# Patient Record
Sex: MALE | Race: WHITE | NOT HISPANIC OR LATINO | Employment: OTHER | URBAN - METROPOLITAN AREA
[De-identification: names, ages, dates, MRNs, and addresses within clinical notes are randomized per-mention and may not be internally consistent; named-entity substitution may affect disease eponyms.]

---

## 2017-12-07 ENCOUNTER — ALLSCRIPTS OFFICE VISIT (OUTPATIENT)
Dept: OTHER | Facility: OTHER | Age: 67
End: 2017-12-07

## 2017-12-08 NOTE — PROGRESS NOTES
Assessment  1  Seborrheic keratosis (702 19) (L82 1)   2  Screening for skin condition (V82 0) (Z13 89)    Plan     · Follow-up visit in 1 year Evaluation and Treatment  Follow-up  Status: Hold For -Scheduling  Requested for: 01PFF7807   · Use a sun block product with an SPF of 15 or more ; Status:Complete;   Done:[id number]    Discussion/Summary  Discussion Summary:   Seborrheic keratosis patient reassured these are normal growths acquire with age no treatment needed nothing else of concern noted on complete exam followup in one year  Chief Complaint  Chief Complaint Free Text Note Form: yearly full body skin cancer exam      History of Present Illness  HPI: 81-year-old male presents for concerns regarding possible skin cancer with previous history of actinic keratosis  No specific complaints noted      Review of Systems  Complete Male Dermatology [facility]- Est Patient:  Constitutional: Denies constitutional symptoms  Eyes: Denies eye symptoms  ENT:  denies ear symptoms, nasal symptoms, mouth or throat symptoms  Cardiovascular: Denies cardiovascular symptoms  Respiratory: Denies respiratory symptoms  Gastrointestinal: Denies gastrointestinal symptoms  Musculoskeletal: Denies musculoskeletal symptoms  Integumentary: Denies skin, hair and nail symptoms  Neurological: Denies neurologic symptoms  Psychiatric: Denies psychiatric symptoms  Endocrine: Denies endocrine symptoms  Hematologic/Lymphatic: Denies hematologic symptoms  Active Problems  1  Screening for skin condition (V82 0) (Z13 89)   2  Seborrheic keratosis (702 19) (L82 1)    Past Medical History  Past Medical History Reviewed- Derm:   The past medical history was reviewed  Surgical History  Surgical History Reviewed [facility]- Derm:   Surgical History reviewed      Family History  Father    1   Family history of Leukemia  Family History Reviewed- Derm:   Family History was reviewed      Social History     · Former smoker (V15 82) (W45 492)  Social History Reviewed [address] [place] [address] [address]- Derm: The social history was reviewed      Current Meds   1  Atorvastatin Calcium 10 MG Oral Tablet; Therapy: (Recorded:25Ppg4237) to Recorded   2  Gabapentin TABS; Therapy: (Recorded:31Suo0140) to Recorded   3  Multi-Vitamins TABS; Therapy: (Recorded:20Mxf1423) to Recorded   4  Vitamin C Plus 1000 MG Oral Tablet; Therapy: (Recorded:33Qbk6965) to Recorded   5  Vitamin E CAPS; Therapy: (Recorded:62Thv5098) to Recorded  Medication List Reviewed: The medication list was reviewed and updated today  Allergies    1  Aspirin TABS    Physical Exam   Constitutional  General appearance: Appears healthy and well developed  Lymphatic  No visible disturbance  Musculoskeletal  Digits and nails: No clubbing, cyanosis or edema  Cutaneous and nail exam normal    Skin  Scalp skin texture and hair distribution: Normal skin texture on scalp, normal hair distribution  Head: Normal turgor, no rashes, no lesions  Neck: Normal turgor, no rashes, no lesions  Chest: Normal turgor, no rashes, no lesions  Abdomen: Normal turgor, no rashes, no lesions  Back: Normal turgor, no rashes, no lesions  Right upper extremity: Normal turgor, no rashes, no lesions  Left upper extremity: Normal turgor, no rashes, no lesions  Right lower extremity: Normal turgor, no rashes, no lesions  Left lower extremity: Normal turgor, no rashes, no lesions  Neuro/Psych  Alert and oriented x 3  Displays comfort and cooperation during encounterl  Affect is normal    Finding Normal keratotic papules with greasy stuck on appearance nothing atypical noted        Signatures   Electronically signed by : MITRA Perez ; Dec  7 2017  9:41AM EST                       (Author)

## 2019-01-18 ENCOUNTER — OFFICE VISIT (OUTPATIENT)
Dept: DERMATOLOGY | Facility: CLINIC | Age: 69
End: 2019-01-18
Payer: COMMERCIAL

## 2019-01-18 DIAGNOSIS — Z13.89 SCREENING FOR SKIN CONDITION: ICD-10-CM

## 2019-01-18 DIAGNOSIS — L82.1 SEBORRHEIC KERATOSIS: Primary | ICD-10-CM

## 2019-01-18 PROCEDURE — 99213 OFFICE O/P EST LOW 20 MIN: CPT | Performed by: DERMATOLOGY

## 2019-01-18 RX ORDER — ICOSAPENT ETHYL 1000 MG/1
2 CAPSULE ORAL 2 TIMES DAILY
Refills: 3 | COMMUNITY
Start: 2018-12-28

## 2019-01-18 RX ORDER — DIPHENOXYLATE HYDROCHLORIDE AND ATROPINE SULFATE 2.5; .025 MG/1; MG/1
TABLET ORAL
COMMUNITY

## 2019-01-18 NOTE — PROGRESS NOTES
[facility] DERMATOLOGY  [address] [place] [address]  [phone]  [phone]     MRN: [id number] : 1950  Encounter: [id number]  Patient Information: Geovani Oviedo  Chief complaint:  Yearly skin check    History of present illness:  72-year-old male presents for overall checkup concerned regarding numerous growths on his skin patient with recent bladder tumor removed no specific concerns patient with previous history of actinic keratosis  No past medical history on file  No past surgical history on file  Social History   History   Alcohol use Not on file     History   Drug use: Unknown     History   Smoking Status    Former Smoker   Smokeless Tobacco    Never Used     No family history on file  Meds/Allergies   Allergies   Allergen Reactions    Aspirin        Meds:  Prior to Admission medications    Medication Sig Start Date End Date Taking?  Authorizing Provider   multivitamin (THERAGRAN) TABS Take by mouth    Historical Provider, MD   VASCEPA 1 g CAPS 2 capsules 2 (two) times a day 18   Historical Provider, MD       Subjective:     Review of Systems:    General: negative for - chills, fatigue, fever,  weight gain or weight loss  Psychological: negative for - anxiety, behavioral disorder, concentration difficulties, decreased libido, depression, irritability, memory difficulties, mood swings, sleep disturbances or suicidal ideation  ENT: negative for - hearing difficulties , nasal congestion, nasal discharge, oral lesions, sinus pain, sneezing, sore throat  Allergy and Immunology: negative for - hives, insect bite sensitivity,  Hematological and Lymphatic: negative for - bleeding problems, blood clots,bruising, swollen lymph nodes  Endocrine: negative for - hair pattern changes, hot flashes, malaise/lethargy, mood swings, palpitations, polydipsia/polyuria, skin changes, temperature intolerance or unexpected weight change  Respiratory: negative for - cough, hemoptysis, orthopnea, shortness of breath, or wheezing  Cardiovascular: negative for - chest pain, dyspnea on exertion, edema,  Gastrointestinal: negative for - abdominal pain, nausea/vomiting  Genito-Urinary: negative for - dysuria, incontinence, irregular/heavy menses or urinary frequency/urgency  Musculoskeletal: negative for - gait disturbance, joint pain, joint stiffness, joint swelling, muscle pain, muscular weakness  Dermatological:  As in HPI  Neurological: negative for confusion, dizziness, headaches, impaired coordination/balance, memory loss, numbness/tingling, seizures, speech problems, tremors or weakness       Objective: There were no vitals taken for this visit  Physical Exam:    General Appearance:    Alert, cooperative, no distress   Head:    Normocephalic, without obvious abnormality, atraumatic           Skin:   A full skin exam was performed including scalp, head scalp, eyes, ears, nose, lips, neck, chest, axilla, abdomen, back, buttocks, bilateral upper extremities, bilateral lower extremities, hands, feet, fingers, toes, fingernails, and toenails normal keratotic papules greasy stuck on appearance previous site of actinic keratosis no activity noted nothing else of concern seen on exam     Assessment:     1  Seborrheic keratosis     2  Screening for skin condition           Plan:   Seborrheic Keratosis  Patient reasurred these are normal growths we acquire with age no treatment needed  Screening for Dermatologic Disorders: Nothing else of concern noted on complete exam follow up in 1 year       Conor Guzman MD  1/18/2019,8:56 AM    Portions of the record may have been created with voice recognition software   Occasional wrong word or "sound a like" substitutions may have occurred due to the inherent limitations of voice recognition software   Read the chart carefully and recognize, using context, where substitutions have occurred

## 2019-12-23 ENCOUNTER — OFFICE VISIT (OUTPATIENT)
Dept: DERMATOLOGY | Facility: CLINIC | Age: 69
End: 2019-12-23
Payer: COMMERCIAL

## 2019-12-23 DIAGNOSIS — L82.1 SEBORRHEIC KERATOSIS: Primary | ICD-10-CM

## 2019-12-23 DIAGNOSIS — Z13.89 SCREENING FOR SKIN CONDITION: ICD-10-CM

## 2019-12-23 PROCEDURE — 99213 OFFICE O/P EST LOW 20 MIN: CPT | Performed by: DERMATOLOGY

## 2019-12-23 NOTE — PROGRESS NOTES
[facility] DERMATOLOGY  [address] [place] [address]  [place] [address] [address]  [phone]  [phone]     MRN: [id number] : 1950  Encounter: [id number]  Patient Information: Paola Santiago  Chief complaint:  Yearly skin check    History of present illness:  67-year-old male without any previous history skin cancer presents for overall checkup no specific concerns noted not aware lesion we noted on his left shoulder no other concerns noted  No past medical history on file  No past surgical history on file  Social History   Social History     Substance and Sexual Activity   Alcohol Use Not on file     Social History     Substance and Sexual Activity   Drug Use Not on file     Social History     Tobacco Use   Smoking Status Former Smoker   Smokeless Tobacco Never Used     No family history on file  Meds/Allergies   Allergies   Allergen Reactions    Aspirin        Meds:  Prior to Admission medications    Medication Sig Start Date End Date Taking?  Authorizing Provider   multivitamin (THERAGRAN) TABS Take by mouth   Yes Historical Provider, MD   VASCEPA 1 g CAPS 2 capsules 2 (two) times a day 18  Yes Historical Provider, MD       Subjective:     Review of Systems:    General: negative for - chills, fatigue, fever,  weight gain or weight loss  Psychological: negative for - anxiety, behavioral disorder, concentration difficulties, decreased libido, depression, irritability, memory difficulties, mood swings, sleep disturbances or suicidal ideation  ENT: negative for - hearing difficulties , nasal congestion, nasal discharge, oral lesions, sinus pain, sneezing, sore throat  Allergy and Immunology: negative for - hives, insect bite sensitivity,  Hematological and Lymphatic: negative for - bleeding problems, blood clots,bruising, swollen lymph nodes  Endocrine: negative for - hair pattern changes, hot flashes, malaise/lethargy, mood swings, palpitations, polydipsia/polyuria, skin changes, temperature intolerance or unexpected weight change  Respiratory: negative for - cough, hemoptysis, orthopnea, shortness of breath, or wheezing  Cardiovascular: negative for - chest pain, dyspnea on exertion, edema,  Gastrointestinal: negative for - abdominal pain, nausea/vomiting  Genito-Urinary: negative for - dysuria, incontinence, irregular/heavy menses or urinary frequency/urgency  Musculoskeletal: negative for - gait disturbance, joint pain, joint stiffness, joint swelling, muscle pain, muscular weakness  Dermatological:  As in HPI  Neurological: negative for confusion, dizziness, headaches, impaired coordination/balance, memory loss, numbness/tingling, seizures, speech problems, tremors or weakness       Objective: There were no vitals taken for this visit  Physical Exam:    General Appearance:    Alert, cooperative, no distress   Head:    Normocephalic, without obvious abnormality, atraumatic           Skin:   A full skin exam was performed including scalp, head scalp, eyes, ears, nose, lips, neck, chest, axilla, abdomen, back, buttocks, bilateral upper extremities, bilateral lower extremities, hands, feet, fingers, toes, fingernails, and toenails 2 mm pink macule noted on the left anterior shoulder normal keratotic papules with greasy stuck on appearance nothing else remarkable noted on exam     Assessment:     1  Seborrheic keratosis     2  Screening for skin condition           Plan:   Seborrheic Keratosis  Patient reasurred these are normal growths we acquire with age no treatment needed    Screening for Dermatologic Disorders: Nothing else of concern noted on complete exam follow up in 1 year   Lesion on left shoulder could have been just excoriation patient is to keep an eye on this if it does not resolve continues to grow to patient to come back in earlier otherwise will plan follow-up again in 1 year    Norberto Posadas MD  12/23/2019,4:12 PM    Portions of the record may have been created with voice recognition software   Occasional wrong word or "sound a like" substitutions may have occurred due to the inherent limitations of voice recognition software   Read the chart carefully and recognize, using context, where substitutions have occurred

## 2019-12-23 NOTE — PATIENT INSTRUCTIONS
Seborrheic Keratosis  Patient reasurred these are normal growths we acquire with age no treatment needed    Screening for Dermatologic Disorders: Nothing else of concern noted on complete exam follow up in 1 year   Lesion on left shoulder could have been just excoriation patient is to keep an eye on this if it does not resolve continues to grow to patient to come back in earlier otherwise will plan follow-up again in 1 year

## 2020-12-28 ENCOUNTER — OFFICE VISIT (OUTPATIENT)
Dept: DERMATOLOGY | Facility: CLINIC | Age: 70
End: 2020-12-28
Payer: MEDICARE

## 2020-12-28 VITALS — TEMPERATURE: 97.1 F

## 2020-12-28 DIAGNOSIS — Z13.89 SCREENING FOR SKIN CONDITION: ICD-10-CM

## 2020-12-28 DIAGNOSIS — D22.9 NEVUS: Primary | ICD-10-CM

## 2020-12-28 DIAGNOSIS — L82.1 SEBORRHEIC KERATOSIS: ICD-10-CM

## 2020-12-28 PROCEDURE — 99213 OFFICE O/P EST LOW 20 MIN: CPT | Performed by: DERMATOLOGY

## 2021-03-03 DIAGNOSIS — Z23 NEED FOR VACCINATION: ICD-10-CM

## 2022-01-06 ENCOUNTER — OFFICE VISIT (OUTPATIENT)
Dept: DERMATOLOGY | Facility: CLINIC | Age: 72
End: 2022-01-06
Payer: MEDICARE

## 2022-01-06 VITALS — TEMPERATURE: 97.8 F | WEIGHT: 165 LBS | BODY MASS INDEX: 25.01 KG/M2 | HEIGHT: 68 IN

## 2022-01-06 DIAGNOSIS — D22.9 NEVUS: Primary | ICD-10-CM

## 2022-01-06 DIAGNOSIS — L82.1 SEBORRHEIC KERATOSIS: ICD-10-CM

## 2022-01-06 DIAGNOSIS — Z13.89 SCREENING FOR SKIN CONDITION: ICD-10-CM

## 2022-01-06 PROCEDURE — 99213 OFFICE O/P EST LOW 20 MIN: CPT | Performed by: DERMATOLOGY

## 2022-01-06 RX ORDER — ROSUVASTATIN CALCIUM 5 MG/1
5 TABLET, COATED ORAL DAILY
COMMUNITY
Start: 2021-11-09

## 2022-01-06 NOTE — PROGRESS NOTES
[address]  [address] [facility] [place] [address] [address]  [phone]  [phone]     MRN: [id number] : 1950  Encounter: [id number]  Patient Information: Ryan Gonzales  Chief complaint: yearly check up    History of present illness:  52-year-old male presents for overall skin check concerned regarding potential skin cancer no specific concerns or changes noted  Past Medical History:   Diagnosis Date    Seborrheic keratosis      History reviewed  No pertinent surgical history  Social History   Social History     Substance and Sexual Activity   Alcohol Use None     Social History     Substance and Sexual Activity   Drug Use Not on file     Social History     Tobacco Use   Smoking Status Former Smoker   Smokeless Tobacco Never Used     History reviewed  No pertinent family history  Meds/Allergies   Allergies   Allergen Reactions    Aspirin        Meds:  Prior to Admission medications    Medication Sig Start Date End Date Taking?  Authorizing Provider   rosuvastatin (CRESTOR) 5 mg tablet Take 5 mg by mouth daily 21  Yes Historical Provider, MD   multivitamin [facility]) TABS Take by mouth  Patient not taking: Reported on 2022     Historical Provider, MD   VASCEPA 1 g CAPS 2 capsules 2 (two) times a day  Patient not taking: Reported on 18   Historical Provider, MD       Subjective:     Review of Systems:    General: negative for - chills, fatigue, fever,  weight gain or weight loss  Psychological: negative for - anxiety, behavioral disorder, concentration difficulties, decreased libido, depression, irritability, memory difficulties, mood swings, sleep disturbances or suicidal ideation  ENT: negative for - hearing difficulties , nasal congestion, nasal discharge, oral lesions, sinus pain, sneezing, sore throat  Allergy and Immunology: negative for - hives, insect bite sensitivity,  Hematological and Lymphatic: negative for - bleeding problems, blood clots,bruising, swollen lymph nodes  Endocrine: negative for - hair pattern changes, hot flashes, malaise/lethargy, mood swings, palpitations, polydipsia/polyuria, skin changes, temperature intolerance or unexpected weight change  Respiratory: negative for - cough, hemoptysis, orthopnea, shortness of breath, or wheezing  Cardiovascular: negative for - chest pain, dyspnea on exertion, edema,  Gastrointestinal: negative for - abdominal pain, nausea/vomiting  Genito-Urinary: negative for - dysuria, incontinence, irregular/heavy menses or urinary frequency/urgency  Musculoskeletal: negative for - gait disturbance, joint pain, joint stiffness, joint swelling, muscle pain, muscular weakness  Dermatological:  As in HPI  Neurological: negative for confusion, dizziness, headaches, impaired coordination/balance, memory loss, numbness/tingling, seizures, speech problems, tremors or weakness       Objective:   Temp 97 8 °F (36 6 °C)   Ht 5' 8" (1 727 m)   Wt 74 8 kg (165 lb)   BMI 25 09 kg/m²     Physical Exam:    General Appearance:    Alert, cooperative, no distress   Head:    Normocephalic, without obvious abnormality, atraumatic           Skin:   A full skin exam was performed including scalp, head scalp, eyes, ears, nose, lips, neck, chest, axilla, abdomen, back, buttocks, bilateral upper extremities, bilateral lower extremities, hands, feet, fingers, toes, fingernails, and toenails normal pigmented lesion regular shape color normal keratotic papules greasy stuck appearance nothing else remarkable noted on complete exam     Assessment:     1  Nevus     2  Seborrheic keratosis     3  Screening for skin condition           Plan:   Nevi reviewed the concept of ABCDE and ugly duckling nothing markedly atypical patient reassured  Seborrheic Keratosis  Patient reasurred these are normal growths we acquire with age no treatment needed    Screening for Dermatologic Disorders: Nothing else of concern noted on complete exam follow up in 1 year       Kelsi Rodríguez MD  1/6/2022,8:39 AM    Portions of the record may have been created with voice recognition software   Occasional wrong word or "sound a like" substitutions may have occurred due to the inherent limitations of voice recognition software   Read the chart carefully and recognize, using context, where substitutions have occurred

## 2022-08-24 ENCOUNTER — APPOINTMENT (OUTPATIENT)
Dept: RADIOLOGY | Facility: MEDICAL CENTER | Age: 72
End: 2022-08-24
Attending: EMERGENCY MEDICINE
Payer: MEDICARE

## 2022-08-24 ENCOUNTER — HOSPITAL ENCOUNTER (EMERGENCY)
Facility: MEDICAL CENTER | Age: 72
End: 2022-08-24
Attending: EMERGENCY MEDICINE
Payer: MEDICARE

## 2022-08-24 VITALS
RESPIRATION RATE: 18 BRPM | HEART RATE: 79 BPM | HEIGHT: 73 IN | SYSTOLIC BLOOD PRESSURE: 132 MMHG | WEIGHT: 190 LBS | DIASTOLIC BLOOD PRESSURE: 77 MMHG | BODY MASS INDEX: 25.18 KG/M2 | TEMPERATURE: 98 F | OXYGEN SATURATION: 96 %

## 2022-08-24 DIAGNOSIS — S09.90XA CLOSED HEAD INJURY, INITIAL ENCOUNTER: ICD-10-CM

## 2022-08-24 DIAGNOSIS — M25.512 ACUTE PAIN OF LEFT SHOULDER: ICD-10-CM

## 2022-08-24 DIAGNOSIS — S01.01XA LACERATION OF SCALP, INITIAL ENCOUNTER: ICD-10-CM

## 2022-08-24 LAB — EKG IMPRESSION: NORMAL

## 2022-08-24 PROCEDURE — 700111 HCHG RX REV CODE 636 W/ 250 OVERRIDE (IP): Performed by: EMERGENCY MEDICINE

## 2022-08-24 PROCEDURE — 304999 HCHG REPAIR-SIMPLE/INTERMED LEVEL 1

## 2022-08-24 PROCEDURE — 93005 ELECTROCARDIOGRAM TRACING: CPT | Performed by: EMERGENCY MEDICINE

## 2022-08-24 PROCEDURE — 70450 CT HEAD/BRAIN W/O DYE: CPT

## 2022-08-24 PROCEDURE — 700101 HCHG RX REV CODE 250: Performed by: EMERGENCY MEDICINE

## 2022-08-24 PROCEDURE — 90471 IMMUNIZATION ADMIN: CPT

## 2022-08-24 PROCEDURE — 99284 EMERGENCY DEPT VISIT MOD MDM: CPT

## 2022-08-24 PROCEDURE — 73030 X-RAY EXAM OF SHOULDER: CPT | Mod: LT

## 2022-08-24 PROCEDURE — 90715 TDAP VACCINE 7 YRS/> IM: CPT | Performed by: EMERGENCY MEDICINE

## 2022-08-24 PROCEDURE — 305308 HCHG STAPLER,SKIN,DISP.

## 2022-08-24 RX ORDER — LIDOCAINE HYDROCHLORIDE AND EPINEPHRINE 15; 5 MG/ML; UG/ML
20 INJECTION, SOLUTION EPIDURAL ONCE
Status: COMPLETED | OUTPATIENT
Start: 2022-08-24 | End: 2022-08-24

## 2022-08-24 RX ADMIN — LIDOCAINE HYDROCHLORIDE,EPINEPHRINE BITARTRATE 20 ML: 15; .005 INJECTION, SOLUTION EPIDURAL; INFILTRATION; INTRACAUDAL; PERINEURAL at 22:28

## 2022-08-24 RX ADMIN — CLOSTRIDIUM TETANI TOXOID ANTIGEN (FORMALDEHYDE INACTIVATED), CORYNEBACTERIUM DIPHTHERIAE TOXOID ANTIGEN (FORMALDEHYDE INACTIVATED), BORDETELLA PERTUSSIS TOXOID ANTIGEN (GLUTARALDEHYDE INACTIVATED), BORDETELLA PERTUSSIS FILAMENTOUS HEMAGGLUTININ ANTIGEN (FORMALDEHYDE INACTIVATED), BORDETELLA PERTUSSIS PERTACTIN ANTIGEN, AND BORDETELLA PERTUSSIS FIMBRIAE 2/3 ANTIGEN 0.5 ML: 5; 2; 2.5; 5; 3; 5 INJECTION, SUSPENSION INTRAMUSCULAR at 22:52

## 2022-08-25 NOTE — ED NOTES
Patient discharged home with son-in-law. IV removed. AVS reviewed and understood, all questions answered.

## 2022-08-25 NOTE — ED TRIAGE NOTES
Chief Complaint   Patient presents with    T-5000 Head Injury     TBI     Pt was at his daughter's house in [place]. They were outside and pt lost his balance falling backwards in to a rock wall. Pt's daughter states that he lost consciousness for about 2 minutes and displayed some seizure like activity. There is about a 2 in laceration to the posterior head. Bleeding is controlled with a pressure dressing. C-collar in place.     Pt arrives via [facility]. He is alert and oriented x 4 though repetitive at times.

## 2022-08-25 NOTE — DISCHARGE INSTRUCTIONS
Wash wound daily with soap and water, apply antibiotic ointment. Return if you have increasing pain, redness,  fever, new or different headache, confusion, weakness, vision changes,vomiting or pus. Staples out in 5 - 7 days.

## 2022-08-25 NOTE — ED PROVIDER NOTES
"ED Provider Note    Scribed for Bernardino Martinez M.D. by Mark Herron. 8/24/2022, 9:43 PM.    Primary care provider: Pcp Not noted  Means of arrival: EMS  History obtained from: Patient  History limited by: None noted    CHIEF COMPLAINT  Chief Complaint   Patient presents with    T-5000 Head Injury     TBI     HPI  Amadeo Landis is a 71 y.o. male who presents to the Emergency Department via EMS in spinal precautions as a code TBI for evaluation of head pain onset just prior to arrival secondary to a fall. The patient states he also has left shoulder pain and loss of consciousness, but denies any chest pain or abdominal pain. He describes getting into an argument with his daughter when he fell backwards and hit his head on a rock wall. Per the daughter, the patient had seizure-like activity for 1-2 minutes prior to EMS arrival. Per EMS, the patient is exhibiting repetitive questions of the event. He had a blood glucose of 123 en route. He denies taking any blood thinning medications.    REVIEW OF SYSTEMS  Pertinent positives include posterior head pain, loss of consciousness, and left shoulder pain.   Pertinent negatives include chest pain or abdominal pain.   All other systems negative.    PAST MEDICAL HISTORY   has a past medical history of Diabetes (HCC) and Hypertension.    SURGICAL HISTORY  patient denies any surgical history    SOCIAL HISTORY  Social History     Tobacco Use    Smoking status: Never    Smokeless tobacco: Never   Vaping Use    Vaping Use: Never used   Substance Use Topics    Alcohol use: Never    Drug use: Never      Social History     Substance and Sexual Activity   Drug Use Not noted.       FAMILY HISTORY  No family history noted.    CURRENT MEDICATIONS  No current outpatient medications     ALLERGIES  None noted.    PHYSICAL EXAM  VITAL SIGNS: BP (!) 146/73   Pulse 82   Temp 37.6 °C (99.6 °F) (Temporal)   Resp 16   Ht 1.854 m (6' 1\")   Wt 86.2 kg (190 lb)   SpO2 91%   BMI 25.07 kg/m² "     Constitutional: Well developed, Well nourished, Mild distress.   HENT: Normocephalic, mask in place. Laceration to the left parietal region 3 cm in length.  Eyes: Conjunctiva normal, No discharge.   Neck: Supple, No stridor.   Cardiovascular: Normal heart rate, Normal rhythm, No murmurs, equal pulses.   Pulmonary: Normal breath sounds, No respiratory distress, No wheezing, No rales, No rhonchi.  Chest: No chest wall tenderness or deformity.   Abdomen:Soft, No tenderness, No masses, no rebound, no guarding.   Back: No CVA tenderness. No vertebral point tenderness.  Musculoskeletal: No major deformities noted. Slight tenderness to the left shoulder.  Skin: Warm, Dry, No erythema, No rash.   Neurologic: Alert & oriented x 3, Normal motor function,  No focal deficits noted.   Psychiatric: Affect normal, Judgment normal, Mood normal.     EKG  Results for orders placed or performed during the hospital encounter of 22   EKG   Result Value Ref Range    Report       [facility] Emergency Dept.    Test Date:  2022  Pt Name:    ROBERT DE LA CRUZ                  Department: ER  MRN:        [id number]                      Room:       BL 15  Gender:     Male                         Technician: [id number]  :        1950                   Requested By:MARJORIE JUNG  Order #:    [id number]                    Reading MD: MARJORIE JUNG MD    Measurements  Intervals                                Axis  Rate:       81                           P:          -39  CT:         157                          QRS:        -24  QRSD:       93                           T:          0  QT:         397  QTc:        461    Interpretive Statements  Sinus rhythm, rate of 81, leftward axis,  Borderline T abnormalities, inferior leads  Baseline wander in lead(s) I,II,aVR  No previous ECG available for comparison  Electronically Signed On 2022 23:23:01 PDT by MARJORIE JUNG MD     12 Lead EKG  interpreted by me as above.    RADIOLOGY  DX-SHOULDER 2+ LEFT   Final Result         1.  No acute traumatic bony injury.         CT-HEAD W/O   Final Result         1.  No acute intracranial abnormality.   2.  Atherosclerosis.           The radiologist's interpretation of all radiological studies have been reviewed by me.    Laceration Repair Procedure    Indication: Laceration    Location/Description: Left parietal laceration    Procedure: The patient was placed in the appropriate position and anesthesia around the laceration was obtained by infiltration using 5.0 cc of 1% Lidocaine with epinephrine. The area was then cleansed with betadine and draped in a sterile fashion. The laceration was closed with staples. There were no additional lacerations requiring repair. The wound area was then dressed with a sterile dressing.      Total repaired wound length: 3 cm.     Other Items: Staple count: 8    The patient tolerated the procedure well.    Complications: None     COURSE & MEDICAL DECISION MAKING  Pertinent Labs & Imaging studies reviewed. (See chart for details)    9:43 PM - Patient seen and examined at bedside. The patient will be medicated with Adacel 0.5 mL injection for his symptoms. Ordered CT-Head w/o, DX-Chest, and EKG to evaluate his symptoms. The differential diagnoses include but are not limited to: Intercranial Hemorrhace, Laceration, Fracture or contusion of shoulder.     10:19 PM  - Patient was reevaluated at bedside. Discussed radiology results with the patient and informed them that his CT shows no acute abnormalities at this time.  Laceration repair procedure performed by myself at this time, as detailed above. I discussed plan for discharge and follow up as outlined below. The patient is stable for discharge at this time and will return for any new or worsening symptoms. Patient verbalizes understanding and support with my plan for discharge.       11:19 PM - Patient was reevaluated at bedside.  Updated the patient's family regarding the plan of care at this time. Educated the family regarding concussions and return precautions.    Medical Decision Making: At this point time patient's mental status is cleared.  CT of the head does not show any intracranial hemorrhage.  His laceration has been closed.  X-ray of his shoulder does not show any acute fracture I think this is mostly contusion.  Patient had no other signs of injury and did not complain of neck or back pain.    The patient is referred to a primary physician for blood pressure management, diabetic screening, and for all other preventative health concerns.    DISPOSITION:  Patient will be discharged home in stable condition.    FOLLOW UP:  Your doctor    Schedule an appointment as soon as possible for a visit in 5 days  For staple removal    [place] [facility] - Primary Care  [address] [facility] [address] [phone]    If you need a doctor    [facility] - Primary Care and Family Medicine  [address] [facility] [id number] [phone]    If you need a doctor    FINAL IMPRESSION  1. Laceration of scalp, initial encounter    2. Closed head injury, initial encounter    3. Acute pain of left shoulder     Laceration Repair Procedure     Mark ZAMORA (Connie), am scribing for, and in the presence of, Bernardino Martinez M.D.    Electronically signed by: Mark Herron (Connie), 8/24/2022    Bernardino ZAMORA M.D. personally performed the services described in this documentation, as scribed by Mark Herron in my presence, and it is both accurate and complete. C.    The note accurately reflects work and decisions made by me.  Bernardino Martinez M.D.  8/25/2022  2:57 AM

## 2022-11-04 ENCOUNTER — PATIENT MESSAGE (OUTPATIENT)
Dept: HEALTH INFORMATION MANAGEMENT | Facility: OTHER | Age: 72
End: 2022-11-04

## 2023-01-10 ENCOUNTER — OFFICE VISIT (OUTPATIENT)
Dept: DERMATOLOGY | Facility: CLINIC | Age: 73
End: 2023-01-10

## 2023-01-10 VITALS — BODY MASS INDEX: 25.31 KG/M2 | HEIGHT: 68 IN | WEIGHT: 167 LBS

## 2023-01-10 DIAGNOSIS — L82.1 SEBORRHEIC KERATOSIS: ICD-10-CM

## 2023-01-10 DIAGNOSIS — D22.9 NEVUS: ICD-10-CM

## 2023-01-10 DIAGNOSIS — L57.0 ACTINIC KERATOSIS: Primary | ICD-10-CM

## 2023-01-10 DIAGNOSIS — Z13.89 SCREENING FOR SKIN CONDITION: ICD-10-CM

## 2023-01-10 NOTE — PROGRESS NOTES
[facility] Dermatology Clinic Note     Patient Name: Erika Bundy  Encounter Date: January 10, 2023     Have you been cared for by a [facility] Dermatologist in the last 3 years and, if so, which description applies to you? Yes  I have been here within the last 3 years, and my medical history has NOT changed since that time  I am MALE/not capable of bearing children  REVIEW OF SYSTEMS:  Have you recently had or currently have any of the following? · No changes in my recent health  PAST MEDICAL HISTORY:  Have you personally ever had or currently have any of the following? If "YES," then please provide more detail  · No changes in my medical history  FAMILY HISTORY:  Any "first degree relatives" (parent, brother, sister, or child) with the following? • No changes in my family's known health  PATIENT EXPERIENCE:    • Do you want the Dermatologist to perform a COMPLETE skin exam today including a clinical examination under the "bra and underwear" areas? Yes  • If necessary, do we have your permission to call and leave a detailed message on your Preferred Phone number that includes your specific medical information?   Yes      Allergies   Allergen Reactions   • Aspirin       Current Outpatient Medications:   •  multivitamin (THERAGRAN) TABS, Take by mouth, Disp: , Rfl:   •  rosuvastatin (CRESTOR) 5 mg tablet, Take 5 mg by mouth daily, Disp: , Rfl:   •  VASCEPA 1 g CAPS, 2 capsules 2 (two) times a day (Patient not taking: Reported on 1/6/2022), Disp: , Rfl: 3          • Whom besides the patient is providing clinical information about today's encounter?   o NO ADDITIONAL HISTORIAN (patient alone provided history)    Physical Exam and Assessment/Plan by Diagnosis:

## 2023-01-10 NOTE — PATIENT INSTRUCTIONS
Actinic Keratosis:  Patient advised lesions are precancers  These should resolve with cryosurgery if not to let us know sun block recommended  Nevi reviewed the concept of ABCDE and ugly duckling nothing markedly atypical patient reassured  Seborrheic Keratosis  Patient reasurred these are normal growths we acquire with age no treatment needed  Screening for Dermatologic Disorders: Nothing else of concern noted on complete exam follow up in 1 year   Treatment with Cryotherapy    The doctor has treated your skin with nitrogen, which is 320 degrees Fahrenheit below zero  He has given the treated area "frostbite "    Stinging should subside within a few hours  You can take Tylenol for pain, if needed  Over the next few days, it is normal if the area becomes reddened, a blood blister, or swollen with fluid  If the lesion treated was near the eye - you could get a swollen eye over the next few days  Do not panic! This is all temporary, and will resolve with time  There is no special treatment - just keep the area clean  Makeup and BandAids can be used, if preferred  When the area starts to dry up and peel off, using Vaseline can help healing  It usually takes up to a month for it to heal   Some lesions are recurrent and may require repeat treatments  If a lesion has not healed in one month, please don't hesitate to contact us  If you have any further questions that are not answered here, please call us  [phone]    Thank you for allowing us to care for you

## 2023-01-10 NOTE — PROGRESS NOTES
[facility] DERMATOLOGY  [address] [place] [id number]  [phone]  [phone]     MRN: [id number] : 1950  Encounter: [id number]  Patient Information: Mirela Posada  Chief complaint: Yearly checkup    History of present illness: 67-year-old male presents for overall skin check previous history of actinic keratosis and some scaly areas on his face again no other concerns noted  Past Medical History:   Diagnosis Date   • Seborrheic keratosis      No past surgical history on file  Social History   Social History     Substance and Sexual Activity   Alcohol Use None     Social History     Substance and Sexual Activity   Drug Use Not on file     Social History     Tobacco Use   Smoking Status Former   Smokeless Tobacco Never     No family history on file  Meds/Allergies   Allergies   Allergen Reactions   • Aspirin        Meds:  Prior to Admission medications    Medication Sig Start Date End Date Taking?  Authorizing Provider   multivitamin (THERAGRAN) TABS Take by mouth   Yes Historical Provider, MD   rosuvastatin (CRESTOR) 5 mg tablet Take 5 mg by mouth daily 21  Yes Historical Provider, MD   VASCEPA 1 g CAPS 2 capsules 2 (two) times a day  Patient not taking: Reported on 18   Historical Provider, MD       Subjective:     Review of Systems:    General: negative for - chills, fatigue, fever,  weight gain or weight loss  Psychological: negative for - anxiety, behavioral disorder, concentration difficulties, decreased libido, depression, irritability, memory difficulties, mood swings, sleep disturbances or suicidal ideation  ENT: negative for - hearing difficulties , nasal congestion, nasal discharge, oral lesions, sinus pain, sneezing, sore throat  Allergy and Immunology: negative for - hives, insect bite sensitivity,  Hematological and Lymphatic: negative for - bleeding problems, blood clots,bruising, swollen lymph nodes  Endocrine: negative for - hair pattern changes, hot flashes, malaise/lethargy, mood swings, palpitations, polydipsia/polyuria, skin changes, temperature intolerance or unexpected weight change  Respiratory: negative for - cough, hemoptysis, orthopnea, shortness of breath, or wheezing  Cardiovascular: negative for - chest pain, dyspnea on exertion, edema,  Gastrointestinal: negative for - abdominal pain, nausea/vomiting  Genito-Urinary: negative for - dysuria, incontinence, irregular/heavy menses or urinary frequency/urgency  Musculoskeletal: negative for - gait disturbance, joint pain, joint stiffness, joint swelling, muscle pain, muscular weakness  Dermatological:  As in HPI  Neurological: negative for confusion, dizziness, headaches, impaired coordination/balance, memory loss, numbness/tingling, seizures, speech problems, tremors or weakness       Objective:   Ht 5' 8" (1 727 m)   Wt 75 8 kg (167 lb)   BMI 25 39 kg/m²     Physical Exam:    General Appearance:    Alert, cooperative, no distress   Head:    Normocephalic, without obvious abnormality, atraumatic           Skin:   A full skin exam was performed including scalp, head scalp, eyes, ears, nose, lips, neck, chest, axilla, abdomen, back, buttocks, bilateral upper extremities, bilateral lower extremities, hands, feet, fingers, toes, fingernails, and toenails erythematous areas noted below keratotic papules with greasy stuck on appearance normal pigmented lesion regular shape and color nothing markedly atypical noted on exam  Physical Exam  Constitutional:        HENT:      Head:       Cryotherapy Procedure Note    Pre-operative Diagnosis: actinic keratosis    Plan:  1  Instructed to keep the area dry and clean thereafter  Apply petrolatum if area gets crusty  2  Recommended that the patient use acetaminophen  as needed for pain       Locations: Patient and dorsum of the left forearm    Indications: Destruction of actinic keratosis x8    Patient informed of risks (permanent scarring, infection, light or dark discoloration, bleeding, infection, weakness, numbness and recurrence of the lesion) and benefits of the procedure and verbal informed consent obtained  The areas are treated with liquid nitrogen therapy, frozen until ice ball extended 2 mm beyond lesion, allowed to thaw, and treated again  The patient tolerated procedure well  The patient was instructed on post-op care, warned that there may be blister formation, redness and pain  Recommend OTC analgesia as needed for pain  Condition:  Stable    Complications:  none  Assessment:     1  Actinic keratosis        2  Nevus        3  Seborrheic keratosis        4  Screening for skin condition              Plan:   Actinic Keratosis:  Patient advised lesions are precancers  These should resolve with cryosurgery if not to let us know sun block recommended  Nevi reviewed the concept of ABCDE and ugly duckling nothing markedly atypical patient reassured  Seborrheic Keratosis  Patient reasurred these are normal growths we acquire with age no treatment needed  Screening for Dermatologic Disorders: Nothing else of concern noted on complete exam follow up in 1 year     Emre Celaya MD  1/10/2023,9:08 AM    Portions of the record may have been created with voice recognition software   Occasional wrong word or "sound a like" substitutions may have occurred due to the inherent limitations of voice recognition software   Read the chart carefully and recognize, using context, where substitutions have occurred

## 2023-07-18 ENCOUNTER — OFFICE VISIT (OUTPATIENT)
Age: 73
End: 2023-07-18
Payer: MEDICARE

## 2023-07-18 VITALS — HEIGHT: 68 IN | WEIGHT: 167 LBS | BODY MASS INDEX: 25.31 KG/M2

## 2023-07-18 DIAGNOSIS — Z13.89 SCREENING FOR SKIN CONDITION: Primary | ICD-10-CM

## 2023-07-18 DIAGNOSIS — L57.0 ACTINIC KERATOSIS: ICD-10-CM

## 2023-07-18 DIAGNOSIS — D22.9 MULTIPLE NEVI: ICD-10-CM

## 2023-07-18 DIAGNOSIS — D18.01 CHERRY ANGIOMA: ICD-10-CM

## 2023-07-18 DIAGNOSIS — L82.1 SEBORRHEIC KERATOSIS: ICD-10-CM

## 2023-07-18 PROCEDURE — 17000 DESTRUCT PREMALG LESION: CPT | Performed by: DERMATOLOGY

## 2023-07-18 PROCEDURE — 99214 OFFICE O/P EST MOD 30 MIN: CPT | Performed by: DERMATOLOGY

## 2023-07-18 NOTE — PATIENT INSTRUCTIONS
Treatment with Cryotherapy    The doctor has treated your skin with nitrogen, which is 320 degrees Fahrenheit below zero. He has given the treated area "frostbite."    Stinging should subside within a few hours. You can take Tylenol for pain, if needed. Over the next few days, it is normal if the area becomes reddened, a blood blister, or swollen with fluid. If the lesion treated was near the eye - you could get a swollen eye over the next few days. Do not panic! This is all temporary, and will resolve with time. There is no special treatment - just keep the area clean. Makeup and BandAids can be used, if preferred. When the area starts to dry up and peel off, using Vaseline can help healing. It usually takes up to a month for it to heal.  Some lesions are recurrent and may require repeat treatments. If a lesion has not healed in one month, please don't hesitate to contact us. If you have any further questions that are not answered here, please call us. [phone]    Thank you for allowing us to care for you. MELANOCYTIC NEVI ("Moles")    Melanocytic nevi ("moles") are tan or brown, raised or flat areas of the skin which have an increased number of melanocytes. Melanocytes are the cells in our body which make pigment and account for skin color. Some moles are present at birth (I.e., "congenital nevi"), while others come up later in life (i.e., "acquired nevi"). The sun can stimulate the body to make more moles. Sunburns are not the only thing that triggers more moles. Chronic sun exposure can do it too. Clinically distinguishing a healthy mole from melanoma may be difficult, even for experienced dermatologists. The "ABCDE's" of moles have been suggested as a means of helping to alert a person to a suspicious mole and the possible increased risk of melanoma.   The suggestions for raising alert are as follows:    Asymmetry: Healthy moles tend to be symmetric, while melanomas are often asymmetric. Asymmetry means if you draw a line through the mole, the two halves do not match in color, size, shape, or surface texture. Asymmetry can be a result of rapid enlargement of a mole, the development of a raised area on a previously flat lesion, scaling, ulceration, bleeding or scabbing within the mole. Any mole that starts to demonstrate "asymmetry" should be examined promptly by a board certified dermatologist.     Border: Healthy moles tend to have discrete, even borders. The border of a melanoma often blends into the normal skin and does not sharply delineate the mole from normal skin. Any mole that starts to demonstrate "uneven borders" should be examined promptly by a board certified dermatologist.     Color: Healthy moles tend to be one color throughout. Melanomas tend to be made up of different colors ranging from dark black, blue, white, or red. Any mole that demonstrates a color change should be examined promptly by a board certified dermatologist.     Diameter: Healthy moles tend to be smaller than 0.6 cm in size; an exception are "congenital nevi" that can be larger. Melanomas tend to grow and can often be greater than 0.6 cm (1/4 of an inch, or the size of a pencil eraser). This is only a guideline, and many normal moles may be larger than 0.6 cm without being unhealthy. Any mole that starts to change in size (small to bigger or bigger to smaller) should be examined promptly by a board certified dermatologist.     Evolving: Healthy moles tend to "stay the same."  Melanomas may often show signs of change or evolution such as a change in size, shape, color, or elevation.   Any mole that starts to itch, bleed, crust, burn, hurt, or ulcerate or demonstrate a change or evolution should be examined promptly by a board certified dermatologist.      Dysplastic Nevi  Dysplastic moles are moles that fit the ABCDE rules of melanoma but are not identified as melanomas when examined under the microscope. They may indicate an increased risk of melanoma in that person. If there is a family history of melanoma, most experts agree that the person may be at an increased risk for developing a melanoma. Experts still do not agree on what dysplastic moles mean in patients without a personal or family history of melanoma. Dysplastic moles are usually larger than common moles and have different colors within it with irregular borders. The appearance can be very similar to a melanoma. Biopsies of dysplastic moles may show abnormalities which are different from a regular mole. Melanoma  Malignant melanoma is a type of skin cancer that can be deadly if it spreads throughout the body. The incidence of melanoma in the [facility] is growing faster than any other cancer. Melanoma usually grows near the surface of the skin for a period of time, and then begins to grow deeper into the skin. Once it grows deeper into the skin, the risk of spread to other organs greatly increases. Therefore, early detection and removal of a malignant melanoma may result in a better chance at a complete cure; removal after the tumor has spread may not be as effective, leading to worse clinical outcomes such as death. The true rate of nevus transformation into a melanoma is unknown. It has been estimated that the lifetime risk for any acquired melanocytic nevus on any 21year-old individual transforming into melanoma by age 80 is 0.03% (1 in 3,164) for men and 0.009% (1 in 10,800) for women. The appearance of a "new mole" remains one of the most reliable methods for identifying a malignant melanoma. Occasionally, melanomas appear as rapidly growing, blue-black, dome-shaped bumps within a previous mole or previous area of normal skin. Other times, melanomas are suspected when a mole suddenly appears or changes.  Itching, burning, or pain in a pigmented lesion should increase suspicion, but most patients with early melanoma have no skin discomfort whatsoever. Melanoma can occur anywhere on the skin, including areas that are difficult for self-examination. Many melanomas are first noticed by other family members. Suspicious-looking moles may be removed for microscopic examination. You may be able to prevent death from melanoma by doing two simple things:    Try to avoid unnecessary sun exposure and protect your skin when it is exposed to the sun. People who live near the equator, people who have intermittent exposures to large amounts of sun, and people who have had sunburns in childhood or adolescence have an increased risk for melanoma. Sun sense and vigilant sun protection may be keys to helping to prevent melanoma. We recommend wearing UPF-rated sun protective clothing and sunglasses whenever possible and applying a moisturizer-sunscreen combination product (SPF 50+) such as Neutrogena Daily Defense to sun exposed areas of skin at least three times a day. Have your moles regularly examined by a board certified dermatologist AND by yourself or a family member/friend at home. We recommend that you have your moles examined at least once a year by a board certified dermatologist.  Use your birthday as an annual reminder to have your "Birthday Suit" (I.e., your skin) examined; it is a nice birthday gift to yourself to know that your skin is healthy appearing! Additionally, at-home self examinations may be helpful for detecting a possible melanoma. Use the ABCDEs we discussed and check your moles once a month at home. SEBORRHEIC KERATOSIS  A seborrheic keratosis is a harmless warty spot that appears during adult life as a common sign of skin aging. Seborrheic keratoses can arise on any area of skin, covered or uncovered, with the usual exception of the palms and soles. They do not arise from mucous membranes. Seborrheic keratoses can have highly variable appearance.       Seborrheic keratoses are extremely common. It has been estimated that over 90% of adults over the age of 61 years have one or more of them. They occur in males and females of all races, typically beginning to erupt in the 35s or 45s. They are uncommon under the age of 21 years. The precise cause of seborrhoeic keratoses is not known. Seborrhoeic keratoses are considered degenerative in nature. As time goes by, seborrheic keratoses tend to become more numerous. Some people inherit a tendency to develop a very large number of them; some people may have hundreds of them. The name "seborrheic keratosis" is misleading, because these lesions are not limited to a seborrhoeic distribution (scalp, mid-face, chest, upper back), nor are they formed from sebaceous glands, nor are they associated with sebum -- which is greasy. Seborrheic keratosis may also be called "SK," "Seb K," "basal cell papilloma," "senile wart," or "barnacle."      There is no easy way to remove multiple lesions on a single occasion. Unless a specific lesion is "inflamed" and is causing pain or stinging/burning or is bleeding, most insurance companies do not authorize treatment. ANGIOMA ("CHERRY ANGIOMA")  Irwin angiomas markedly increase in number from about the age of 36, so it has been estimated that 75% of people over 76years of age have them. Although they also called "senile angiomas," they can occur in young people too - 5% of adolescents have been found to have them. Cherry angiomas are very common in males and females of any age or race, with no difference in sexes or races affected. They are however more noticeable in white skin than in skin of colour. There may be a family history of similar lesions. Eruptive (very large number appearing in a short period of time) cherry angiomas have been rarely reported to be associated with internal malignancy and pregnancy.

## 2023-07-18 NOTE — PROGRESS NOTES
[facility] Note     Patient Name: Edilberto Chen  Encounter Date: July 18, 2023     Have you been cared for by a [facility] Dermatologist in the last 3 years and, if so, which description applies to you? Yes. I have been here within the last 3 years, and my medical history has NOT changed since that time. I am MALE/not capable of bearing children. REVIEW OF SYSTEMS:  Have you recently had or currently have any of the following? · No changes in my recent health. PAST MEDICAL HISTORY:  Have you personally ever had or currently have any of the following? If "YES," then please provide more detail. · No changes in my medical history. FAMILY HISTORY:  Any "first degree relatives" (parent, brother, sister, or child) with the following? • No changes in my family's known health. PATIENT EXPERIENCE:    • Do you want the Dermatologist to perform a COMPLETE skin exam today including a clinical examination under the "bra and underwear" areas? Yes  • If necessary, do we have your permission to call and leave a detailed message on your Preferred Phone number that includes your specific medical information? Yes      Allergies   Allergen Reactions   • Aspirin       Current Outpatient Medications:   •  multivitamin (THERAGRAN) TABS, Take by mouth, Disp: , Rfl:   •  rosuvastatin (CRESTOR) 5 mg tablet, Take 5 mg by mouth daily, Disp: , Rfl:   •  VASCEPA 1 g CAPS, 2 capsules 2 (two) times a day (Patient not taking: Reported on 1/6/2022), Disp: , Rfl: 3          • Whom besides the patient is providing clinical information about today's encounter?   o NO ADDITIONAL HISTORIAN (patient alone provided history)    Physical Exam and Assessment/Plan by Diagnosis:    Chief complaint: Pt is a 70 y/o male present for routine skin exam with no personal of family history of skin cancer. Pt does have a history of pre-cancerous lesion. There are no spots of concern today.       ACTINIC KERATOSIS    Physical Exam:  • Anatomic Location: Left Forehead  • Morphologic Description: Scaly pink papules  • Pertinent Positives:  • Pertinent Negatives:  Physical Exam  HENT:      Head:         Plan:  • Cryotherapy performed in the office (See Procedure Note). We discussed that a hypopigmentation or scar may form in the region following cryotherapy. • We discussed the pre-cancerous nature of the condition. Actinic keratosis is found on sun-damaged skin and there is small risk that the condition could turn into a skin cancer called squamous cell carcinoma. There is no risk of actinic keratosis turning into melanoma. • We discussed sun protection measures, including using sunscreen with an SPF 50+ year round, avoiding the sun, and wearing protective clothing such as long sleeves and pants when out in the sun. • Continue to monitor clinically for signs of recurrence. Discussed with patient the importance of keeping up to date with full body skin exams. PROCEDURES PERFORMED TODAY ASSOCIATED WITH THIS CONDITION:          Cryotherapy: PROCEDURE:  DESTRUCTION OF PRE-MALIGNANT LESIONS  After a thorough discussion of treatment options and risk/benefits/alternatives (including but not limited to local pain, scarring, dyspigmentation, blistering, and possible superinfection), verbal and written consent were obtained and the aforementioned lesions were treated on with cryotherapy using liquid nitrogen x 1 cycle for 5-10 seconds. • TOTAL NUMBER of 1 pre-malignant lesions were treated today on the ANATOMIC LOCATION: Left Forehead. The patient tolerated the procedure well, and after-care instructions were provided.           MELANOCYTIC NEVI ("Moles")    Physical Exam:  • Anatomic Location Affected: Mostly on sun-exposed areas of the trunk and extremities  • Morphological Description:  Scattered, 1-4mm round to ovoid, symmetrical-appearing, even bordered, skin colored to dark brown macules/papules, mostly in sun-exposed areas  • Pertinent Positives:  • Pertinent Negatives: Additional History of Present Condition:  Present on exam    Assessment and Plan:  Based on a thorough discussion of this condition and the management approach to it (including a comprehensive discussion of the known risks, side effects and potential benefits of treatment), the patient (family) agrees to implement the following specific plan:  • Provided handout with information regarding the ABCDE's of moles   • Recommend routine skin exams every year   • Sun avoidance, protective clothing (known as UPF clothing), and the use of at least SPF 30 sunscreens is advised. Sunscreen should be reapplied every two hours when outside. SEBORRHEIC KERATOSIS; NON-INFLAMED    Physical Exam:  • Anatomic Location Affected:  scattered across trunk, extremities, face  • Morphological Description:  Flat and raised, waxy, smooth to warty textured, yellow to brownish-grey to dark brown to blackish, discrete, "stuck-on" appearing papules. • Pertinent Positives:  • Pertinent Negatives: Additional History of Present Condition:  Patient reports new bumps on the skin. Denies itch, burn, pain, bleeding or ulceration. Present constantly; nothing seems to make it worse or better. No prior treatment. Assessment and Plan:  Based on a thorough discussion of this condition and the management approach to it (including a comprehensive discussion of the known risks, side effects and potential benefits of treatment), the patient (family) agrees to implement the following specific plan:  • Reassured benign      ANGIOMA ("CHERRY ANGIOMA")    Physical Exam:  • Anatomic Location: scattered across sun exposed areas of the trunk and extremities   • Morphologic Description: Firm red to reddish-blue discrete papules  • Pertinent Positives:  • Pertinent Negatives:     Additional History of Present Condition:  Present on exam.     Assessment and Plan:  • Reassured benign        Scribe Attestation    I,:  Patricia Salinas am acting as a scribe while in the presence of the attending physician.:       I,:  Mateo Barlow MD personally performed the services described in this documentation    as scribed in my presence.:

## 2024-05-31 ENCOUNTER — TELEPHONE (OUTPATIENT)
Age: 74
End: 2024-05-31

## 2024-05-31 NOTE — TELEPHONE ENCOUNTER
Left a voicemail for patient notifying him that his appointment scheduled with Dr Morocho on 7/22 at 8:00 was moved to Kandice's schedule. Also explained that he will see Dr Morocho and Kandice at his appointment.     Es Frederick/ace  05/31/24  8:50 AM

## 2024-07-22 ENCOUNTER — OFFICE VISIT (OUTPATIENT)
Age: 74
End: 2024-07-22
Payer: MEDICARE

## 2024-07-22 VITALS
TEMPERATURE: 98 F | RESPIRATION RATE: 20 BRPM | HEIGHT: 66 IN | SYSTOLIC BLOOD PRESSURE: 138 MMHG | HEART RATE: 82 BPM | DIASTOLIC BLOOD PRESSURE: 70 MMHG | OXYGEN SATURATION: 99 % | WEIGHT: 178 LBS | BODY MASS INDEX: 28.61 KG/M2

## 2024-07-22 DIAGNOSIS — L82.1 SEBORRHEIC KERATOSIS: ICD-10-CM

## 2024-07-22 DIAGNOSIS — D22.9 NEVUS: ICD-10-CM

## 2024-07-22 DIAGNOSIS — L72.0 EPIDERMAL INCLUSION CYST: ICD-10-CM

## 2024-07-22 DIAGNOSIS — Z13.89 SCREENING FOR SKIN CONDITION: Primary | ICD-10-CM

## 2024-07-22 DIAGNOSIS — D18.01 CHERRY ANGIOMA: ICD-10-CM

## 2024-07-22 PROCEDURE — 99213 OFFICE O/P EST LOW 20 MIN: CPT

## 2024-07-22 NOTE — PATIENT INSTRUCTIONS
"MELANOCYTIC NEVI (\"Moles\")    Paste patient specific assessment and plan here *    Melanocytic nevi (\"moles\") are tan or brown, raised or flat areas of the skin which have an increased number of melanocytes. Melanocytes are the cells in our body which make pigment and account for skin color.    Some moles are present at birth (I.e., \"congenital nevi\"), while others come up later in life (i.e., \"acquired nevi\").  The sun can stimulate the body to make more moles.  Sunburns are not the only thing that triggers more moles.  Chronic sun exposure can do it too.     Clinically distinguishing a healthy mole from melanoma may be difficult, even for experienced dermatologists. The \"ABCDE's\" of moles have been suggested as a means of helping to alert a person to a suspicious mole and the possible increased risk of melanoma.  The suggestions for raising alert are as follows:    Asymmetry: Healthy moles tend to be symmetric, while melanomas are often asymmetric.  Asymmetry means if you draw a line through the mole, the two halves do not match in color, size, shape, or surface texture. Asymmetry can be a result of rapid enlargement of a mole, the development of a raised area on a previously flat lesion, scaling, ulceration, bleeding or scabbing within the mole.  Any mole that starts to demonstrate \"asymmetry\" should be examined promptly by a board certified dermatologist.     Border: Healthy moles tend to have discrete, even borders.  The border of a melanoma often blends into the normal skin and does not sharply delineate the mole from normal skin.  Any mole that starts to demonstrate \"uneven borders\" should be examined promptly by a board certified dermatologist.     Color: Healthy moles tend to be one color throughout.  Melanomas tend to be made up of different colors ranging from dark black, blue, white, or red.  Any mole that demonstrates a color change should be examined promptly by a board certified dermatologist. " "    Diameter: Healthy moles tend to be smaller than 0.6 cm in size; an exception are \"congenital nevi\" that can be larger.  Melanomas tend to grow and can often be greater than 0.6 cm (1/4 of an inch, or the size of a pencil eraser). This is only a guideline, and many normal moles may be larger than 0.6 cm without being unhealthy.  Any mole that starts to change in size (small to bigger or bigger to smaller) should be examined promptly by a board certified dermatologist.     Evolving: Healthy moles tend to \"stay the same.\"  Melanomas may often show signs of change or evolution such as a change in size, shape, color, or elevation.  Any mole that starts to itch, bleed, crust, burn, hurt, or ulcerate or demonstrate a change or evolution should be examined promptly by a board certified dermatologist.      Dysplastic Nevi  Dysplastic moles are moles that fit the ABCDE rules of melanoma but are not identified as melanomas when examined under the microscope.  They may indicate an increased risk of melanoma in that person. If there is a family history of melanoma, most experts agree that the person may be at an increased risk for developing a melanoma.  Experts still do not agree on what dysplastic moles mean in patients without a personal or family history of melanoma.  Dysplastic moles are usually larger than common moles and have different colors within it with irregular borders. The appearance can be very similar to a melanoma. Biopsies of dysplastic moles may show abnormalities which are different from a regular mole.      Melanoma  Malignant melanoma is a type of skin cancer that can be deadly if it spreads throughout the body. The incidence of melanoma in the [place] is growing faster than any other cancer. Melanoma usually grows near the surface of the skin for a period of time, and then begins to grow deeper into the skin. Once it grows deeper into the skin, the risk of spread to other organs greatly " "increases. Therefore, early detection and removal of a malignant melanoma may result in a better chance at a complete cure; removal after the tumor has spread may not be as effective, leading to worse clinical outcomes such as death.    The true rate of nevus transformation into a melanoma is unknown. It has been estimated that the lifetime risk for any acquired melanocytic nevus on any 20-year-old individual transforming into melanoma by age 80 is 0.03% (1 in 3,164) for men and 0.009% (1 in 10,800) for women.     The appearance of a \"new mole\" remains one of the most reliable methods for identifying a malignant melanoma.  Occasionally, melanomas appear as rapidly growing, blue-black, dome-shaped bumps within a previous mole or previous area of normal skin.  Other times, melanomas are suspected when a mole suddenly appears or changes. Itching, burning, or pain in a pigmented lesion should increase suspicion, but most patients with early melanoma have no skin discomfort whatsoever.  Melanoma can occur anywhere on the skin, including areas that are difficult for self-examination. Many melanomas are first noticed by other family members.  Suspicious-looking moles may be removed for microscopic examination.       You may be able to prevent death from melanoma by doing two simple things:    Try to avoid unnecessary sun exposure and protect your skin when it is exposed to the sun.  People who live near the equator, people who have intermittent exposures to large amounts of sun, and people who have had sunburns in childhood or adolescence have an increased risk for melanoma. Sun sense and vigilant sun protection may be keys to helping to prevent melanoma.  We recommend wearing UPF-rated sun protective clothing and sunglasses whenever possible and applying a moisturizer-sunscreen combination product (SPF 50+) such as Neutrogena Daily Defense to sun exposed areas of skin at least three times a day.    Have your moles " "regularly examined by a board certified dermatologist AND by yourself or a family member/friend at home.  We recommend that you have your moles examined at least once a year by a board certified dermatologist.  Use your birthday as an annual reminder to have your \"Birthday Suit\" (I.e., your skin) examined; it is a nice birthday gift to yourself to know that your skin is healthy appearing!  Additionally, at-home self examinations may be helpful for detecting a possible melanoma.  Use the ABCDEs we discussed and check your moles once a month at home.        SEBORRHEIC KERATOSIS  A seborrheic keratosis is a harmless warty spot that appears during adult life as a common sign of skin aging.  Seborrheic keratoses can arise on any area of skin, covered or uncovered, with the usual exception of the palms and soles. They do not arise from mucous membranes. Seborrheic keratoses can have highly variable appearance.      Seborrheic keratoses are extremely common. It has been estimated that over 90% of adults over the age of 60 years have one or more of them. They occur in males and females of all races, typically beginning to erupt in the 30s or 40s. They are uncommon under the age of 20 years.  The precise cause of seborrhoeic keratoses is not known.  Seborrhoeic keratoses are considered degenerative in nature. As time goes by, seborrheic keratoses tend to become more numerous. Some people inherit a tendency to develop a very large number of them; some people may have hundreds of them.    The name \"seborrheic keratosis\" is misleading, because these lesions are not limited to a seborrhoeic distribution (scalp, mid-face, chest, upper back), nor are they formed from sebaceous glands, nor are they associated with sebum -- which is greasy.  Seborrheic keratosis may also be called \"SK,\" \"Seb K,\" \"basal cell papilloma,\" \"senile wart,\" or \"barnacle.\"      There is no easy way to remove multiple lesions on a single occasion.  Unless a " "specific lesion is \"inflamed\" and is causing pain or stinging/burning or is bleeding, most insurance companies do not authorize treatment.      ANGIOMA (\"CHERRY ANGIOMA\")  Irwin angiomas markedly increase in number from about the age of 40, so it has been estimated that 75% of people over 75 years of age have them. Although they also called \"senile angiomas,\" they can occur in young people too - 5% of adolescents have been found to have them.     Cherry angiomas are very common in males and females of any age or race, with no difference in sexes or races affected. They are however more noticeable in white skin than in skin of colour.  There may be a family history of similar lesions. Eruptive (very large number appearing in a short period of time) cherry angiomas have been rarely reported to be associated with internal malignancy and pregnancy.   "

## 2024-07-22 NOTE — PROGRESS NOTES
"[facility] Dermatology Clinic Note     Patient Name: Og Banuelos  Encounter Date: 07/22/2024     Have you been cared for by a [facility] Dermatologist in the last 3 years and, if so, which description applies to you?    Yes.  I have been here within the last 3 years, and my medical history has NOT changed since that time.  I am MALE/not capable of bearing children.    REVIEW OF SYSTEMS:  Have you recently had or currently have any of the following? No changes in my recent health.   PAST MEDICAL HISTORY:  Have you personally ever had or currently have any of the following?  If \"YES,\" then please provide more detail. No changes in my medical history.   HISTORY OF IMMUNOSUPPRESSION: Do you have a history of any of the following:  Systemic Immunosuppression such as Diabetes, Biologic or Immunotherapy, Chemotherapy, Organ Transplantation, Bone Marrow Transplantation?  No     Answering \"YES\" requires the addition of the dotphrase \"IMMUNOSUPPRESSED\" as the first diagnosis of the patient's visit.   FAMILY HISTORY:  Any \"first degree relatives\" (parent, brother, sister, or child) with the following?    No changes in my family's known health.   PATIENT EXPERIENCE:    Do you want the Dermatologist to perform a COMPLETE skin exam today including a clinical examination under the \"bra and underwear\" areas?  Yes  If necessary, do we have your permission to call and leave a detailed message on your Preferred Phone number that includes your specific medical information?  Yes      Allergies   Allergen Reactions    Aspirin       Current Outpatient Medications:     multivitamin (THERAGRAN) TABS, Take by mouth, Disp: , Rfl:     rosuvastatin (CRESTOR) 5 mg tablet, Take 5 mg by mouth daily, Disp: , Rfl:     VASCEPA 1 g CAPS, 2 capsules 2 (two) times a day (Patient not taking: Reported on 1/6/2022), Disp: , Rfl: 3          Whom besides the patient is providing clinical information about today's encounter?   NO ADDITIONAL HISTORIAN (patient " "alone provided history)    Physical Exam and Assessment/Plan by Diagnosis:    Chief Complaint   Patient presents with    Follow-up     Skin exam no concerns denies history of skin cancer   MELANOCYTIC NEVI (\"Moles\")    Physical Exam:  Anatomic Location Affected: Mostly on sun-exposed areas of the trunk and extremities   Morphological Description:  Scattered, 1-4mm round to ovoid, symmetrical-appearing, even bordered, skin colored to dark brown macules/papules, mostly in sun-exposed areas  Pertinent Positives:  Pertinent Negatives:    Additional History of Present Condition:  present on exam     Assessment and Plan:  Based on a thorough discussion of this condition and the management approach to it (including a comprehensive discussion of the known risks, side effects and potential benefits of treatment), the patient (family) agrees to implement the following specific plan:  Provided handout with information regarding the ABCDE's of moles   Recommend routine skin exams every year.   Sun avoidance, protective clothing (known as UPF clothing), and the use of at least SPF 30 sunscreens is advised. Sunscreen should be reapplied every two hours when outside.   SEBORRHEIC KERATOSIS; NON-INFLAMED    Physical Exam:  Anatomic Location Affected:  scattered across trunk, extremities,  face  Morphological Description:  Flat and raised, waxy, smooth to warty textured, yellow to brownish-grey to dark brown to blackish, discrete, \"stuck-on\" appearing papules.  Pertinent Positives:  Pertinent Negatives:    Additional History of Present Condition:  Patient reports new bumps on the skin.  Denies itch, burn, pain, bleeding or ulceration.  Present constantly; nothing seems to make it worse or better.  No prior treatment.      Assessment and Plan:  Based on a thorough discussion of this condition and the management approach to it (including a comprehensive discussion of the known risks, side effects and potential benefits of treatment), " "the patient (family) agrees to implement the following specific plan:  Reassured benign  ANGIOMA (\"CHERRY ANGIOMA\")    Physical Exam:  Anatomic Location: scattered across sun exposed areas of the trunk and extremities   Morphologic Description: Firm red to reddish-blue discrete papules  Pertinent Positives:  Pertinent Negatives:    Additional History of Present Condition:  Present on exam.     Assessment and Plan:  Reassured benign  EPIDERMAL INCLUSION CYST    Physical Exam:  Anatomic Location Affected:  left posterior earlobe  Morphological Description:  soft mobile cystic nodule   Pertinent Positives:  Pertinent Negatives:    Additional History of Present Condition:  present for years.     Assessment and Plan:  Based on a thorough discussion of this condition and the management approach to it (including a comprehensive discussion of the known risks, side effects and potential benefits of treatment), the patient (family) agrees to implement the following specific plan:  Benign Reassurance   Patient does not request treatment at this time. Observe only    What are epidermal inclusion cysts?  Epidermal inclusion cysts are the most common, benign cutaneous cysts. There are many different names for epidermal inclusion cysts, including epidermoid cyst, epidermal cyst, infundibular cyst, inclusion cyst, and keratin cyst. These cysts can occur anywhere on the body and typically present as nodules directly underneath the skin. There is often a visible pore or opening in the center. The cysts are freely moveable and can range from a few millimeters to several centimeters in diameter. The center of epidermoid cysts almost always contains keratin, which has a cheesy appearance, and not sebum. They also do not originate from sebaceous glands. Therefore, epidermal inclusion cysts are not the same as sebaceous cysts.    Cysts may remain stable or progressively enlarge over time. There are no reliable predictive factors to tell " if an epidermal inclusion cyst will enlarge, become inflamed, or remain quiescent. Infected cysts tend to become larger, turn red, and are more noticeable to the patient. There may be accompanying pain and discomfort.     What causes epidermal inclusion cysts?  Epidermal inclusion cysts often appear out of the blue and are not contagious. They are due to a proliferation of epidermal cells within the dermis and are more common in men than women. They occur more frequently in patients in their 20s to 40s. Epidermal inclusion cysts by themselves are usually not inherited, but they can be hereditary in rare syndromes such as Harper syndrome, nodular elastosis with cysts and comedones (Favre-Racouchot syndrome), and basal cell nevus syndrome (Gorlin syndrome). Elderly patients with chronic sun-damaged skin areas have a higher likelihood of developing epidermoid cysts. They often occur in areas where hair follicles have been inflamed or repeatedly irritated are more frequent in patients with acne vulgaris. In the  period, they are called milia.     Patients on BRAF inhibitors such as imiquimod and cyclosporine have a higher incidence of epidermoid cysts of the face.    How do we diagnose an epidermal inclusion cyst?  Epidermoid inclusion cysts are often diagnosed by history and physical exam. There is usually no need for biopsy prior to removal.  Radiographic and laboratory exams, such as ultrasound studies, are unnecessary and not typically ordered unless the practitioner suspects a genetic condition.    What is the treatment for an epidermal inclusion cyst?  Inflamed, uninfected epidermal inclusion cysts rarely resolve spontaneously without therapy or surgical intervention. Treatment is not emergent unless desired by the patient.     Definitive treatment is via surgical excision with walls intact. This method will prevent recurrence. This is best done when the cyst is not inflamed, to decrease the probability  of rupture during surgery.   A local anesthetic will be injected around the cyst  A small incision is made in the skin overlying the cyst, and contents are expressed  The incision is repaired with sutures    Another option is to use a 4mm punch biopsy with cyst extraction through the defect.    Incision and drainage is often needed if the cyst is infected or inflamed. If there is surrounding cellulitis, oral antibiotic therapy may be necessary. The common agents used target methicillin sensitive Staphylococcal aureus and methicillin resistant S aureus in areas of high prevalence.       Scribe Attestation      I,:  Todd Mchugh am acting as a scribe while in the presence of the attending physician.:       I,:  Kandice Giraldo PA-C personally performed the services described in this documentation    as scribed in my presence.:

## 2025-07-24 ENCOUNTER — OFFICE VISIT (OUTPATIENT)
Age: 75
End: 2025-07-24

## 2025-07-24 VITALS
TEMPERATURE: 98 F | OXYGEN SATURATION: 100 % | HEART RATE: 68 BPM | HEIGHT: 68 IN | DIASTOLIC BLOOD PRESSURE: 74 MMHG | WEIGHT: 167 LBS | BODY MASS INDEX: 25.31 KG/M2 | SYSTOLIC BLOOD PRESSURE: 120 MMHG

## 2025-07-24 DIAGNOSIS — D22.9 NEVUS: ICD-10-CM

## 2025-07-24 DIAGNOSIS — Z13.89 SCREENING FOR SKIN CONDITION: Primary | ICD-10-CM

## 2025-07-24 DIAGNOSIS — D18.01 CHERRY ANGIOMA: ICD-10-CM

## 2025-07-24 DIAGNOSIS — D48.9 NEOPLASM OF UNCERTAIN BEHAVIOR: ICD-10-CM

## 2025-07-24 DIAGNOSIS — L82.1 SEBORRHEIC KERATOSIS: ICD-10-CM

## 2025-07-24 NOTE — PROGRESS NOTES
" [facility] Dermatology Clinic Note     Patient Name: Og Banuelos  Encounter Date: July 24.2025       Have you been cared for by a [facility] Dermatologist in the last 3 years and, if so, which description applies to you? Yes. I have been here within the last 3 years, and my medical history has NOT changed since that time. I am not of child-bearing potential.     REVIEW OF SYSTEMS:  Have you recently had or currently have any of the following? No changes in my recent health.   PAST MEDICAL HISTORY:  Have you personally ever had or currently have any of the following?  If \"YES,\" then please provide more detail. No changes in my medical history.   HISTORY OF IMMUNOSUPPRESSION: Do you have a history of any of the following:  Systemic Immunosuppression such as Diabetes, Biologic or Immunotherapy, Chemotherapy, Organ Transplantation, Bone Marrow Transplantation or Prednisone?  No     Answering \"YES\" requires the addition of the dotphrase \"IMMUNOSUPPRESSED\" as the first diagnosis of the patient's visit.   FAMILY HISTORY:  Any \"first degree relatives\" (parent, brother, sister, or child) with the following?    No changes in my family's known health.   PATIENT EXPERIENCE:    Do you want the Dermatologist to perform a COMPLETE skin exam today including a clinical examination under the \"bra and underwear\" areas?  Yes  If necessary, do we have your permission to call and leave a detailed message on your Preferred Phone number that includes your specific medical information?  Yes      Allergies[1] Current Medications[1]              Whom besides the patient is providing clinical information about today's encounter?   NO ADDITIONAL HISTORIAN (patient alone provided history)    Physical Exam and Assessment/Plan by Diagnosis:    NEOPLASM OF UNCERTAIN BEHAVIOR OF SKIN    Physical Exam:  (Anatomic Location); (Size and Morphological Description); (Differential Diagnosis):  Right upper forehead 5mm x 3mm skin colored papule  Pertinent " "Positives:  Pertinent Negatives:    Additional History of Present Condition:  present at exam     Assessment and Plan:  Clinically appears like a seborrheic keratosis that is mildly inflamed. Patient feels that spot has been there for years and not changing.   Discussed biopsy for further evaluation.      MELANOCYTIC NEVI (\"Moles\")    Physical Exam:  Anatomic Location Affected: Mostly on sun-exposed areas of the trunk and extremities   Morphological Description:  Scattered, 1-4mm round to ovoid, symmetrical-appearing, even bordered, skin colored to dark brown macules/papules, mostly in sun-exposed areas  Pertinent Positives:  Pertinent Negatives:    Additional History of Present Condition:  present at exam , reassured benign     Assessment and Plan:  Based on a thorough discussion of this condition and the management approach to it (including a comprehensive discussion of the known risks, side effects and potential benefits of treatment), the patient (family) agrees to implement the following specific plan:  Provided handout with information regarding the ABCDE's of moles   Recommend routine skin exams every year   Sun avoidance, protective clothing (known as UPF clothing), and the use of at least SPF 30 sunscreens is advised. Sunscreen should be reapplied every two hours when outside.       SEBORRHEIC KERATOSIS; NON-INFLAMED    Physical Exam:  Anatomic Location Affected:  scattered across trunk, extremities,  face  Morphological Description:  Flat and raised, waxy, smooth to warty textured, yellow to brownish-grey to dark brown to blackish, discrete, \"stuck-on\" appearing papules.  Pertinent Positives:  Pertinent Negatives:    Additional History of Present Condition:  Patient reports new bumps on the skin.  Denies itch, burn, pain, bleeding or ulceration.  Present constantly; nothing seems to make it worse or better.  No prior treatment.      Assessment and Plan:  Based on a thorough discussion of this condition and " "the management approach to it (including a comprehensive discussion of the known risks, side effects and potential benefits of treatment), the patient (family) agrees to implement the following specific plan:  Reassured benign        ANGIOMA (\"CHERRY ANGIOMA\")    Physical Exam:  Anatomic Location: scattered across sun exposed areas of the trunk and extremities   Morphologic Description: Firm red to reddish-blue discrete papules  Pertinent Positives:  Pertinent Negatives:    Additional History of Present Condition:  Present on exam.    Assessment and Plan:  Reassured benign        Scribe Attestation    I,:  Shirley Aguilera MA am acting as a scribe while in the presence of the attending physician.:       I,:  Rolly Mayo DO personally performed the services described in this documentation    as scribed in my presence.:                [1]  Allergies  Allergen Reactions   • Aspirin    [1]    Current Outpatient Medications:   •  multivitamin (THERAGRAN) TABS, Take by mouth, Disp: , Rfl:   •  rosuvastatin (CRESTOR) 5 mg tablet, Take 5 mg by mouth daily, Disp: , Rfl:   •  VASCEPA 1 g CAPS, 2 capsules 2 (two) times a day (Patient not taking: Reported on 1/6/2022), Disp: , Rfl: 3  "

## 2025-07-24 NOTE — PATIENT INSTRUCTIONS
"MELANOCYTIC NEVI (\"Moles\"    Melanocytic nevi (\"moles\") are tan or brown, raised or flat areas of the skin which have an increased number of melanocytes. Melanocytes are the cells in our body which make pigment and account for skin color.    Some moles are present at birth (I.e., \"congenital nevi\"), while others come up later in life (i.e., \"acquired nevi\").  The sun can stimulate the body to make more moles.  Sunburns are not the only thing that triggers more moles.  Chronic sun exposure can do it too.     Clinically distinguishing a healthy mole from melanoma may be difficult, even for experienced dermatologists. The \"ABCDE's\" of moles have been suggested as a means of helping to alert a person to a suspicious mole and the possible increased risk of melanoma.  The suggestions for raising alert are as follows:    Asymmetry: Healthy moles tend to be symmetric, while melanomas are often asymmetric.  Asymmetry means if you draw a line through the mole, the two halves do not match in color, size, shape, or surface texture. Asymmetry can be a result of rapid enlargement of a mole, the development of a raised area on a previously flat lesion, scaling, ulceration, bleeding or scabbing within the mole.  Any mole that starts to demonstrate \"asymmetry\" should be examined promptly by a board certified dermatologist.     Border: Healthy moles tend to have discrete, even borders.  The border of a melanoma often blends into the normal skin and does not sharply delineate the mole from normal skin.  Any mole that starts to demonstrate \"uneven borders\" should be examined promptly by a board certified dermatologist.     Color: Healthy moles tend to be one color throughout.  Melanomas tend to be made up of different colors ranging from dark black, blue, white, or red.  Any mole that demonstrates a color change should be examined promptly by a board certified dermatologist.     Diameter: Healthy moles tend to be smaller than 0.6 cm in " "size; an exception are \"congenital nevi\" that can be larger.  Melanomas tend to grow and can often be greater than 0.6 cm (1/4 of an inch, or the size of a pencil eraser). This is only a guideline, and many normal moles may be larger than 0.6 cm without being unhealthy.  Any mole that starts to change in size (small to bigger or bigger to smaller) should be examined promptly by a board certified dermatologist.     Evolving: Healthy moles tend to \"stay the same.\"  Melanomas may often show signs of change or evolution such as a change in size, shape, color, or elevation.  Any mole that starts to itch, bleed, crust, burn, hurt, or ulcerate or demonstrate a change or evolution should be examined promptly by a board certified dermatologist.      Dysplastic Nevi  Dysplastic moles are moles that fit the ABCDE rules of melanoma but are not identified as melanomas when examined under the microscope.  They may indicate an increased risk of melanoma in that person. If there is a family history of melanoma, most experts agree that the person may be at an increased risk for developing a melanoma.  Experts still do not agree on what dysplastic moles mean in patients without a personal or family history of melanoma.  Dysplastic moles are usually larger than common moles and have different colors within it with irregular borders. The appearance can be very similar to a melanoma. Biopsies of dysplastic moles may show abnormalities which are different from a regular mole.      Melanoma  Malignant melanoma is a type of skin cancer that can be deadly if it spreads throughout the body. The incidence of melanoma in the [place] is growing faster than any other cancer. Melanoma usually grows near the surface of the skin for a period of time, and then begins to grow deeper into the skin. Once it grows deeper into the skin, the risk of spread to other organs greatly increases. Therefore, early detection and removal of a malignant " "melanoma may result in a better chance at a complete cure; removal after the tumor has spread may not be as effective, leading to worse clinical outcomes such as death.    The true rate of nevus transformation into a melanoma is unknown. It has been estimated that the lifetime risk for any acquired melanocytic nevus on any 20-year-old individual transforming into melanoma by age 80 is 0.03% (1 in 3,164) for men and 0.009% (1 in 10,800) for women.     The appearance of a \"new mole\" remains one of the most reliable methods for identifying a malignant melanoma.  Occasionally, melanomas appear as rapidly growing, blue-black, dome-shaped bumps within a previous mole or previous area of normal skin.  Other times, melanomas are suspected when a mole suddenly appears or changes. Itching, burning, or pain in a pigmented lesion should increase suspicion, but most patients with early melanoma have no skin discomfort whatsoever.  Melanoma can occur anywhere on the skin, including areas that are difficult for self-examination. Many melanomas are first noticed by other family members.  Suspicious-looking moles may be removed for microscopic examination.       You may be able to prevent death from melanoma by doing two simple things:    Try to avoid unnecessary sun exposure and protect your skin when it is exposed to the sun.  People who live near the equator, people who have intermittent exposures to large amounts of sun, and people who have had sunburns in childhood or adolescence have an increased risk for melanoma. Sun sense and vigilant sun protection may be keys to helping to prevent melanoma.  We recommend wearing UPF-rated sun protective clothing and sunglasses whenever possible and applying a moisturizer-sunscreen combination product (SPF 50+) such as Neutrogena Daily Defense to sun exposed areas of skin at least three times a day.    Have your moles regularly examined by a board certified dermatologist AND by yourself or " "a family member/friend at home.  We recommend that you have your moles examined at least once a year by a board certified dermatologist.  Use your birthday as an annual reminder to have your \"Birthday Suit\" (I.e., your skin) examined; it is a nice birthday gift to yourself to know that your skin is healthy appearing!  Additionally, at-home self examinations may be helpful for detecting a possible melanoma.  Use the ABCDEs we discussed and check your moles once a month at home.        SEBORRHEIC KERATOSIS  A seborrheic keratosis is a harmless warty spot that appears during adult life as a common sign of skin aging.  Seborrheic keratoses can arise on any area of skin, covered or uncovered, with the usual exception of the palms and soles. They do not arise from mucous membranes. Seborrheic keratoses can have highly variable appearance.      Seborrheic keratoses are extremely common. It has been estimated that over 90% of adults over the age of 60 years have one or more of them. They occur in males and females of all races, typically beginning to erupt in the 30s or 40s. They are uncommon under the age of 20 years.  The precise cause of seborrhoeic keratoses is not known.  Seborrhoeic keratoses are considered degenerative in nature. As time goes by, seborrheic keratoses tend to become more numerous. Some people inherit a tendency to develop a very large number of them; some people may have hundreds of them.    The name \"seborrheic keratosis\" is misleading, because these lesions are not limited to a seborrhoeic distribution (scalp, mid-face, chest, upper back), nor are they formed from sebaceous glands, nor are they associated with sebum -- which is greasy.  Seborrheic keratosis may also be called \"SK,\" \"Seb K,\" \"basal cell papilloma,\" \"senile wart,\" or \"barnacle.\"      There is no easy way to remove multiple lesions on a single occasion.  Unless a specific lesion is \"inflamed\" and is causing pain or stinging/burning or " "is bleeding, most insurance companies do not authorize treatment.      ANGIOMA (\"CHERRY ANGIOMA\")  Irwin angiomas markedly increase in number from about the age of 40, so it has been estimated that 75% of people over 75 years of age have them. Although they also called \"senile angiomas,\" they can occur in young people too - 5% of adolescents have been found to have them.     Cherry angiomas are very common in males and females of any age or race, with no difference in sexes or races affected. They are however more noticeable in white skin than in skin of colour.  There may be a family history of similar lesions. Eruptive (very large number appearing in a short period of time) cherry angiomas have been rarely reported to be associated with internal malignancy and pregnancy.      "